# Patient Record
Sex: FEMALE | Race: OTHER | Employment: STUDENT | URBAN - NONMETROPOLITAN AREA
[De-identification: names, ages, dates, MRNs, and addresses within clinical notes are randomized per-mention and may not be internally consistent; named-entity substitution may affect disease eponyms.]

---

## 2023-09-01 ENCOUNTER — CLINICAL DOCUMENTATION (OUTPATIENT)
Dept: FAMILY MEDICINE CLINIC | Age: 23
End: 2023-09-01

## 2023-09-01 DIAGNOSIS — M25.551 RIGHT HIP PAIN: Primary | ICD-10-CM

## 2023-09-01 NOTE — PROGRESS NOTES
[facility] Evaluation Template      Name Maximino Gómez   Student ID [id number]   Date of Evaluation 8/31/2023   Sport Tennis   Year in [employer]   Date of Birth 2000   Phone [phone]   Name of Examining Practitioner Dr. Sherren Coulter    Injury R hip pain   Date of Injury         History: Ath has had R intermittent pain for since winter. Ath c/o pain with running and change of direction with tennis. No popping or clicking     Evaluation: No ttp greater troch or hip flexor, TTP adductors, pubic ramus, and pubic symphysis ; negative sit up, negative log roll, negative hip scour, approx 5 degree IR    Recommendations/Restrictions:  Activity as tolerated, formal physical therapy, and xray hip with pelvis    Diagnosis: R hip pain- tendonitis vs MARIXA vs sports hernia    Cleared/Not Cleared: Cleared as tolerated     Other Testing/Procedure:  [x] X-Ray   [] MRI   [] CT Scan   [x] PT/OT   [] Other    Follow-Up: PRN    AT Signature: Narayan Pederson    Physician Signature:

## 2023-09-14 ENCOUNTER — HOSPITAL ENCOUNTER (OUTPATIENT)
Dept: GENERAL RADIOLOGY | Age: 23
Discharge: HOME | End: 2023-09-14
Payer: COMMERCIAL

## 2023-09-14 ENCOUNTER — HOSPITAL ENCOUNTER (OUTPATIENT)
Age: 23
Discharge: HOME | End: 2023-09-14
Payer: COMMERCIAL

## 2023-09-14 ENCOUNTER — HOSPITAL ENCOUNTER (OUTPATIENT)
Dept: PHYSICAL THERAPY | Age: 23
Setting detail: THERAPIES SERIES
Discharge: HOME | End: 2023-09-14
Payer: COMMERCIAL

## 2023-09-14 DIAGNOSIS — M25.551 RIGHT HIP PAIN: ICD-10-CM

## 2023-09-14 PROCEDURE — 97163 PT EVAL HIGH COMPLEX 45 MIN: CPT

## 2023-09-14 PROCEDURE — 97140 MANUAL THERAPY 1/> REGIONS: CPT

## 2023-09-14 PROCEDURE — 73502 X-RAY EXAM HIP UNI 2-3 VIEWS: CPT

## 2023-09-14 PROCEDURE — 97110 THERAPEUTIC EXERCISES: CPT

## 2023-09-14 NOTE — PROGRESS NOTES
** PLEASE SIGN, DATE AND TIME CERTIFICATION BELOW AND RETURN TO [facility] OUTPATIENT REHABILITATION (FAX #: [phone]). ATTEST/CO-SIGN IF ACCESSING VIA [employer] THANK YOU.**    I certify that I have examined the patient below and determined that Physical Medicine and Rehabilitation service is necessary and that I approve the established plan of care for up to 90 days or as specifically noted. Attestation, signature or co-signature of physician indicates approval of certification requirements.    ________________________ ____________ __________  Physician Signature   Date   Time  [address] [facility]  PHYSICAL THERAPY  [x] EVALUATION  [] DAILY NOTE (LAND) [] DAILY NOTE (AQUATIC ) [] PROGRESS NOTE [] DISCHARGE NOTE    [x] [address] [facility] [address] [facility] [place]   [] [address] [place] [address]    [] [address] [place] [address]   [] [facility]    Date: 2023  Patient Name:  Martir Hughes  : 2000  MRN: [id number]  CSN: [id number]    Referring Practitioner Ceasar Haile MD   Diagnosis Right hip pain [M25.551]    Treatment Diagnosis Right hip pain    Date of Evaluation 23    Additional Pertinent History Cyst removal off lumbar spine 3 years ago . R radial fracture      Functional Outcome Measure Used LEFS    Functional Outcome Score 59 (23)       Insurance: Primary: Payor: Kelly Mallory /  /  / ,   Secondary: NOEMI LYNN   Authorization Information: PRE CERTIFICATION REQUIRED: NO  INSURANCE THERAPY BENEFIT:  21 VISITS -HARDMAX -NONE USED  AQUATIC THERAPY COVERED: YES  MODALITIES COVERED:  YES  TELEHEALTH COVERED:   REFERENCE NUMBER: HANK 2023      Visit # 1, 1/10 for progress note   Visits Allowed: 20 hard max   Recertification Date:    Physician Follow-Up: No current follow up scheduled   Physician Orders: Evaluate and Treat   History of Present Illness:  Patient  presents with right anterior hip pain present since 3/23.   Patient reports that

## 2023-09-19 ENCOUNTER — HOSPITAL ENCOUNTER (OUTPATIENT)
Dept: PHYSICAL THERAPY | Age: 23
Setting detail: THERAPIES SERIES
Discharge: HOME | End: 2023-09-19
Payer: COMMERCIAL

## 2023-09-19 PROCEDURE — 97110 THERAPEUTIC EXERCISES: CPT

## 2023-09-19 PROCEDURE — 97140 MANUAL THERAPY 1/> REGIONS: CPT

## 2023-09-19 NOTE — PROGRESS NOTES
[facility]  Therapy Contact Note      Date: 2023  Patient Name: Yahaira Newberry        MRN: [id number]    Account Number: [de-identified]  : 2000  (26 y.o.)  Gender: female       Set up [facility] transportation for the following dates:    23 1315  23 1015  10/3/23 845  10/5/23 930  10/10/23 1145  10/12/23 1145  10/17/23 1145  10/19/23 1145  10/24/23 1145  10/26/23 1145  10/31/23 1145  23 224 Darci Tom has in their notes that the patient address is [employer].  time 30 minutes before listed appointment time.     Karina Ibarra, Rehab Tech
treatment well  []  Patient limited by fatigue  [x]  Patient limited by pain   []  Patient limited by medical complications  []  Other:     Assessment: Initiated manual therapy intervention with use of Hawk  this date with increased erythema noted throughout quad, hip adductors and sartorius mm groups. Initiated additional flexibility activities with maintaining pain free ROM. Initiated additional strengthening activities without reported increase in pain, if staying in limited ROM. Body Structures/Functions/Activity Limitations: impaired activity tolerance, impaired balance, impaired endurance, and impaired motor control, strength, gait, activity tolerance, core stability  Prognosis: good    GOALS:  Patient Goal: Play tennis without pain    Short Term Goals:  5 weeks  Patient will demonstrate good core engagement and postural awareness during therapy session with out cues  to carry over to sport related activities   Patient will improve  lumbar and hip AROM  extension to 10 degrees  bilaterally to decrease strain and return to sport  Patient will improve r strength to 4/5  in all planes for stabilization when  running   Patient will tolerate running for 20 minutes on even surfaces with with < 3/10 pain. Long Term Goals: 10 weeks  Patient will improve AROM of R hip  flexion from 0 degrees to 110 degrees to normalize gait pattern. Patient will improve R hip and knee strength to 5/5 to perform single leg squat with level pelvis to assist with return sport without restriction. Patient will perform R single limb stance x 30 seconds while maintaining level pelvis to tolerate gait and running  on uneven surfaces. Patient to demonstrate knowledge and compliance of HEP  Patient to report decreased anterior hip pain to 1/10 following sport  Patient Education:   [x]  HEP/Education Completed: Plan of Care, Goals, POC, Role of PT, initiation of HEP with handouts provided.   [facility]

## 2023-09-21 ENCOUNTER — HOSPITAL ENCOUNTER (OUTPATIENT)
Dept: PHYSICAL THERAPY | Age: 23
Setting detail: THERAPIES SERIES
Discharge: HOME | End: 2023-09-21
Payer: COMMERCIAL

## 2023-09-21 PROCEDURE — 97110 THERAPEUTIC EXERCISES: CPT

## 2023-09-21 NOTE — PROGRESS NOTES
[address] [facility]  PHYSICAL THERAPY  [] EVALUATION  [x] DAILY NOTE (LAND) [] DAILY NOTE (AQUATIC ) [] PROGRESS NOTE [] DISCHARGE NOTE    [x] [facility] [place]   [] [address] [place] [address]    [] [facility]   [] [facility]    Date: 2023  Patient Name:  Maximino Gómez  : 2000  MRN: [id number]  CSN: [id number]    Referring Practitioner Claudia Dorantes MD   Diagnosis Right hip pain [M25.551]    Treatment Diagnosis Right hip pain    Date of Evaluation 23    Additional Pertinent History Cyst removal off lumbar spine 3 years ago . R radial fracture      Functional Outcome Measure Used LEFS    Functional Outcome Score 59 (23)       Insurance: Primary: Payor: Gustavo Gant /  /  / ,   Secondary: NOEMI LYNN   Authorization Information: PRE CERTIFICATION REQUIRED: NO  INSURANCE THERAPY BENEFIT:  21 VISITS -HARDMAX -NONE USED  AQUATIC THERAPY COVERED: YES  MODALITIES COVERED:  YES  TELEHEALTH COVERED:   REFERENCE NUMBER: HANK 2023      Visit # 3, 3/10 for progress note   Visits Allowed: 20 hard max   Recertification Date: 38   Physician Follow-Up: No current follow up scheduled   Physician Orders: Evaluate and Treat   History of Present Illness:  Patient  presents with right anterior hip pain present since 3/23. Patient reports that her pain began idiopathic initially with R point specific anterior groin pain. Patient reports anterior sharp hip pain progressively has gotten worse throughout the entire anterior thigh, lateral thigh and now stiffness throughout lumbar spine. 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 Patient reports difficulty with serving, running, twisting, cutting, jumping and hopping. Patient reports difficulty with hip flexion following serving or running activities. Patient has been actively participating with ATC services, and is scheduled for an xray.

## 2023-09-26 ENCOUNTER — HOSPITAL ENCOUNTER (OUTPATIENT)
Dept: PHYSICAL THERAPY | Age: 23
Setting detail: THERAPIES SERIES
Discharge: HOME | End: 2023-09-26
Payer: COMMERCIAL

## 2023-09-26 PROCEDURE — 97110 THERAPEUTIC EXERCISES: CPT

## 2023-09-26 NOTE — PROGRESS NOTES
720 [facility]  PHYSICAL THERAPY  [] EVALUATION  [x] DAILY NOTE (LAND) [] DAILY NOTE (AQUATIC ) [] PROGRESS NOTE [] DISCHARGE NOTE    [x] OUTPATIENT REHABILITATION [facility]ENTER [place]   [] [address] [place] [address]    [] [facility]   [] Everett Hartmann    Date: 2023  Patient Name:  Kevin Chambers  : 2000  MRN: [id number]  CSN: [id number]    Referring Practitioner El Gillespie MD   Diagnosis Right hip pain [M25.551]    Treatment Diagnosis Right hip pain    Date of Evaluation 23    Additional Pertinent History Cyst removal off lumbar spine 3 years ago . R radial fracture      Functional Outcome Measure Used LEFS    Functional Outcome Score 59 (23)       Insurance: Primary: Payor: Ronald Ramon /  /  / ,   Secondary: NOEMI LYNN   Authorization Information: PRE CERTIFICATION REQUIRED: NO  INSURANCE THERAPY BENEFIT:  21 VISITS -HARDMAX -NONE USED  AQUATIC THERAPY COVERED: YES  MODALITIES COVERED:  YES  TELEHEALTH COVERED:   REFERENCE NUMBER: HANK 2023      Visit # 4, 4/10 for progress note   Visits Allowed: 20 hard max   Recertification Date:    Physician Follow-Up: No current follow up scheduled   Physician Orders: Evaluate and Treat   History of Present Illness:  Patient  presents with right anterior hip pain present since 3/23. Patient reports that her pain began idiopathic initially with R point specific anterior groin pain. Patient reports anterior sharp hip pain progressively has gotten worse throughout the entire anterior thigh, lateral thigh and now stiffness throughout lumbar spine. 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 Patient reports difficulty with hip flexion following serving or running activities. Patient has been actively participating with ATC services, and is scheduled for an xray.

## 2023-09-28 ENCOUNTER — APPOINTMENT (OUTPATIENT)
Dept: PHYSICAL THERAPY | Age: 23
End: 2023-09-28
Payer: COMMERCIAL

## 2023-10-03 ENCOUNTER — HOSPITAL ENCOUNTER (OUTPATIENT)
Dept: PHYSICAL THERAPY | Age: 23
Setting detail: THERAPIES SERIES
Discharge: HOME | End: 2023-10-03
Payer: COMMERCIAL

## 2023-10-03 PROCEDURE — 97110 THERAPEUTIC EXERCISES: CPT

## 2023-10-03 NOTE — PROGRESS NOTES
while maintaining level pelvis to tolerate gait and running  on uneven surfaces. Patient to demonstrate knowledge and compliance of HEP  Patient to report decreased anterior hip pain to 1/10 following sport  Patient Education:   [x]  HEP/Education Completed: Educated patient on need for continued hip flexor stretching between matches and improving core activation in serving to decrease lumbar spine strain during athletic competition  [address] [place] Access Code:  []  No new Education completed  []  Reviewed Prior HEP      [x]  Patient verbalized and/or demonstrated understanding of education provided. []  Patient unable to verbalize and/or demonstrate understanding of education provided. Will continue education. []  Barriers to learning:     PLAN:  Treatment Recommendations: Strengthening, Range of Motion, Balance Training, Functional Mobility Training, Transfer Training, Endurance Training, Gait Training, Stair Training, Neuromuscular Re-education, Manual Therapy - Soft Tissue Mobilization, Manual Therapy - Joint Manipulation, Pain Management, Home Exercise Program, Patient Education, Self-Care Education and Training, Positioning, Integrative Dry Needling, and Modalities    [x]  Plan of care initiated. Plan to see patient 2 times per week for 10 weeks to address the treatment planned outlined above.   []  Continue with current plan of care  []  Modify plan of care as follows:    []  Hold pending physician visit  []  Discharge    Time In 0830   Time Out 0915   Timed Code Minutes: 38 min   Total Treatment Time: 45 min     Electronically Signed by: Donita Harrison PT

## 2023-10-05 ENCOUNTER — HOSPITAL ENCOUNTER (OUTPATIENT)
Dept: PHYSICAL THERAPY | Age: 23
Setting detail: THERAPIES SERIES
Discharge: HOME | End: 2023-10-05
Payer: COMMERCIAL

## 2023-10-05 PROCEDURE — 97110 THERAPEUTIC EXERCISES: CPT

## 2023-10-05 NOTE — PROGRESS NOTES
720 [facility]  PHYSICAL THERAPY  [] EVALUATION  [x] DAILY NOTE (LAND) [] DAILY NOTE (AQUATIC ) [] PROGRESS NOTE [] DISCHARGE NOTE    [x] OUTPATIENT REHABILITATION [facility]ENTER [place]   [] [address] [place] [address]    [] [facility]   [] Mary Sizer    Date: 10/5/2023  Patient Name:  Manuel Cobb  : 2000  MRN: [id number]  CSN: [id number]    Referring Practitioner Adelia Campbell MD   Diagnosis Right hip pain [M25.551]    Treatment Diagnosis Right hip pain    Date of Evaluation 23    Additional Pertinent History Cyst removal off lumbar spine 3 years ago . R radial fracture      Functional Outcome Measure Used LEFS    Functional Outcome Score 59 (23)       Insurance: Primary: Payor: Tina Mckee /  /  / ,   Secondary: NOEMI LYNN   Authorization Information: PRE CERTIFICATION REQUIRED: NO  INSURANCE THERAPY BENEFIT:  21 VISITS -HARDMAX -NONE USED  AQUATIC THERAPY COVERED: YES  MODALITIES COVERED:  YES  TELEHEALTH COVERED:   REFERENCE NUMBER: HANK 2023      Visit # 6, 6/10 for progress note   Visits Allowed: 20 hard max   Recertification Date:    Physician Follow-Up: No current follow up scheduled   Physician Orders: Evaluate and Treat   History of Present Illness:  Patient  presents with right anterior hip pain present since 3/23. Patient reports that her pain began idiopathic initially with R point specific anterior groin pain. Patient reports anterior sharp hip pain progressively has gotten worse throughout the entire anterior thigh, lateral thigh and now stiffness throughout lumbar spine. 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 Patient reports difficulty with hip flexion following serving or running activities. Patient has been actively participating with ATC services, and is scheduled for an xray.

## 2023-10-10 ENCOUNTER — APPOINTMENT (OUTPATIENT)
Dept: PHYSICAL THERAPY | Age: 23
End: 2023-10-10
Payer: COMMERCIAL

## 2023-10-12 ENCOUNTER — APPOINTMENT (OUTPATIENT)
Dept: PHYSICAL THERAPY | Age: 23
End: 2023-10-12
Payer: COMMERCIAL

## 2023-10-17 ENCOUNTER — HOSPITAL ENCOUNTER (OUTPATIENT)
Dept: PHYSICAL THERAPY | Age: 23
Setting detail: THERAPIES SERIES
Discharge: HOME | End: 2023-10-17
Payer: COMMERCIAL

## 2023-10-17 PROCEDURE — 97110 THERAPEUTIC EXERCISES: CPT

## 2023-10-17 NOTE — PROGRESS NOTES
[address] [facility]  PHYSICAL THERAPY  [] EVALUATION  [x] DAILY NOTE (LAND) [] DAILY NOTE (AQUATIC ) [] PROGRESS NOTE [] DISCHARGE NOTE    [x] OUTPATIENT REHABILITATION [facility] [place]   [] [address] [place] [address]    [] [facility]   [] [facility]    Date: 10/17/2023  Patient Name:  Shayna Baez  : 2000  MRN: [id number]  CSN: [id number]    Referring Practitioner John Bhagat MD   Diagnosis Right hip pain [M25.551]    Treatment Diagnosis Right hip pain    Date of Evaluation 23    Additional Pertinent History Cyst removal off lumbar spine 3 years ago . R radial fracture      Functional Outcome Measure Used LEFS    Functional Outcome Score 59 (23)       Insurance: Primary: Payor: Olivia Hernandez /  /  / ,   Secondary: NOEMI LYNN   Authorization Information: PRE CERTIFICATION REQUIRED: NO  INSURANCE THERAPY BENEFIT:  21 VISITS -HARDMAX -NONE USED  AQUATIC THERAPY COVERED: YES  MODALITIES COVERED:  YES  TELEHEALTH COVERED:   REFERENCE NUMBER: HANK 2023      Visit # 7, 10 for progress note   Visits Allowed: 20 hard max   Recertification Date: 29   Physician Follow-Up: No current follow up scheduled   Physician Orders: Evaluate and Treat   History of Present Illness:  Patient  presents with right anterior hip pain present since 3/23. Patient reports that her pain began idiopathic initially with R point specific anterior groin pain. Patient reports anterior sharp hip pain progressively has gotten worse throughout the entire anterior thigh, lateral thigh and now stiffness throughout lumbar spine. 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 Patient reports difficulty with hip flexion following serving or running activities. Patient has been actively participating with ATC services, and is scheduled for an xray.

## 2023-10-19 ENCOUNTER — HOSPITAL ENCOUNTER (OUTPATIENT)
Dept: PHYSICAL THERAPY | Age: 23
Setting detail: THERAPIES SERIES
Discharge: HOME | End: 2023-10-19
Payer: COMMERCIAL

## 2023-10-19 PROCEDURE — 97110 THERAPEUTIC EXERCISES: CPT

## 2023-10-19 NOTE — PROGRESS NOTES
[address] [facility]  PHYSICAL THERAPY  [] EVALUATION  [x] DAILY NOTE (LAND) [] DAILY NOTE (AQUATIC ) [] PROGRESS NOTE [] DISCHARGE NOTE    [x] OUT[facility]AB[facility] [place]   [] [address] [place] [address]    [] [facility]   [] Marleni Chaney    Date: 10/19/2023  Patient Name:  Jose Francisco Duenas  : 2000  MRN: [id number]  CSN: [id number]    Referring Practitioner Mirta Maciel MD   Diagnosis Right hip pain [M25.551]    Treatment Diagnosis Right hip pain    Date of Evaluation 23    Additional Pertinent History Cyst removal off lumbar spine 3 years ago . R radial fracture      Functional Outcome Measure Used LEFS    Functional Outcome Score 59 (23)       Insurance: Primary: Payor: Paxton Morgan /  /  / ,   Secondary: NOEMI LYNN   Authorization Information: PRE CERTIFICATION REQUIRED: NO  INSURANCE THERAPY BENEFIT:  21 VISITS -HARDMAX -NONE USED  AQUATIC THERAPY COVERED: YES  MODALITIES COVERED:  YES  TELEHEALTH COVERED:   REFERENCE NUMBER: HANK 2023      Visit # 9, 10 for progress note   Visits Allowed: 20 hard max   Recertification Date: 80/3/46   Physician Follow-Up: No current follow up scheduled   Physician Orders: Evaluate and Treat   History of Present Illness:  Patient  presents with right anterior hip pain present since 3/23. Patient reports that her pain began idiopathic initially with R point specific anterior groin pain. Patient reports anterior sharp hip pain progressively has gotten worse throughout the entire anterior thigh, lateral thigh and now stiffness throughout lumbar spine. 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 Patient reports difficulty with hip flexion following serving or running activities. Patient has been actively participating with ATC services, and is scheduled for an xray.

## 2023-10-24 ENCOUNTER — APPOINTMENT (OUTPATIENT)
Dept: PHYSICAL THERAPY | Age: 23
End: 2023-10-24
Payer: COMMERCIAL

## 2023-10-26 ENCOUNTER — HOSPITAL ENCOUNTER (OUTPATIENT)
Dept: PHYSICAL THERAPY | Age: 23
Setting detail: THERAPIES SERIES
Discharge: HOME | End: 2023-10-26
Payer: COMMERCIAL

## 2023-10-26 PROCEDURE — 97110 THERAPEUTIC EXERCISES: CPT

## 2023-10-26 NOTE — PROGRESS NOTES
Patient tolerated treatment well  []  Patient limited by fatigue  [x]  Patient limited by pain   []  Patient limited by medical complications  []  Other:     Assessment:   Continued progressive strengthening this date with additional B LE strengthening,   Patient reporting increased fatigue in involved LE. Denied increase in pain. Patient able to progress to standing stretches this date with relief reported following. Body Structures/Functions/Activity Limitations: impaired activity tolerance, impaired balance, impaired endurance, and impaired motor control, strength, gait, activity tolerance, core stability  Prognosis: good    GOALS:  Patient Goal: Play tennis without pain    Short Term Goals:  5 weeks  Patient will demonstrate good core engagement and postural awareness during therapy session with out cues  to carry over to sport related activities   Patient will improve  lumbar and hip AROM  extension to 10 degrees  bilaterally to decrease strain and return to sport  Patient will improve r strength to 4/5  in all planes for stabilization when  running   Patient will tolerate running for 20 minutes on even surfaces with with < 3/10 pain. Long Term Goals: 10 weeks  Patient will improve AROM of R hip  flexion from 0 degrees to 110 degrees to normalize gait pattern. Patient will improve R hip and knee strength to 5/5 to perform single leg squat with level pelvis to assist with return sport without restriction. Patient will perform R single limb stance x 30 seconds while maintaining level pelvis to tolerate gait and running  on uneven surfaces.     Patient to demonstrate knowledge and compliance of HEP  Patient to report decreased anterior hip pain to 1/10 following sport  Patient Education:   [x]  HEP/Education Completed: Educated patient on need for continued hip flexor stretching between matches and improving core activation in serving to decrease lumbar spine strain during athletic

## 2023-10-31 ENCOUNTER — HOSPITAL ENCOUNTER (OUTPATIENT)
Dept: PHYSICAL THERAPY | Age: 23
Setting detail: THERAPIES SERIES
Discharge: HOME | End: 2023-10-31
Payer: COMMERCIAL

## 2023-10-31 PROCEDURE — 97110 THERAPEUTIC EXERCISES: CPT

## 2023-10-31 NOTE — PROGRESS NOTES
720 [facility]  PHYSICAL THERAPY  [] EVALUATION  [x] DAILY NOTE (LAND) [] DAILY NOTE (AQUATIC ) [] PROGRESS NOTE [] DISCHARGE NOTE    [x] OUT[facility]AB[facility] [place]   [] [address] [place] [address]    [] [facility]   [] Araceli Cho    Date: 10/31/2023  Patient Name:  Jenna Javier  : 2000  MRN: [id number]  CSN: [id number]    Referring Practitioner Marycruz Qureshi MD   Diagnosis Right hip pain [M25.551]    Treatment Diagnosis Right hip pain    Date of Evaluation 23    Additional Pertinent History Cyst removal off lumbar spine 3 years ago . R radial fracture      Functional Outcome Measure Used LEFS    Functional Outcome Score 59 (23)       Insurance: Primary: Payor: Landy Dixon /  /  / ,   Secondary: NOEMI LYNN   Authorization Information: PRE CERTIFICATION REQUIRED: NO  INSURANCE THERAPY BENEFIT:  21 VISITS -HARDMAX -NONE USED  AQUATIC THERAPY COVERED: YES  MODALITIES COVERED:  YES  TELEHEALTH COVERED:   REFERENCE NUMBER: HANK 2023      Visit # 10, 10 for progress note   Visits Allowed: 20 hard max   Recertification Date:    Physician Follow-Up: No current follow up scheduled   Physician Orders: Evaluate and Treat   History of Present Illness:  Patient  presents with right anterior hip pain present since 3/23. Patient reports that her pain began idiopathic initially with R point specific anterior groin pain. Patient reports anterior sharp hip pain progressively has gotten worse throughout the entire anterior thigh, lateral thigh and now stiffness throughout lumbar spine. 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 Patient reports difficulty with hip flexion following serving or running activities. Patient has been actively participating with ATC services, and is scheduled for an xray.

## 2023-11-02 ENCOUNTER — HOSPITAL ENCOUNTER (OUTPATIENT)
Dept: PHYSICAL THERAPY | Age: 23
Setting detail: THERAPIES SERIES
Discharge: HOME | End: 2023-11-02
Payer: COMMERCIAL

## 2023-11-02 PROCEDURE — 97110 THERAPEUTIC EXERCISES: CPT

## 2023-11-02 NOTE — DISCHARGE SUMMARY
720 [facility]  PHYSICAL THERAPY  [] EVALUATION  [] DAILY NOTE (LAND) [] DAILY NOTE (AQUATIC ) [] PROGRESS NOTE [x] DISCHARGE NOTE    [x] OUT[facility]AB[facility]ATION [facility] [place]   [] [address] [place] [address]    [] [facility]   [] Alexandrea Carvera    Date: 2023  Patient Name:  David Chowdary  : 2000  MRN: [id number]  CSN: [id number]    Referring Practitioner Pedro Irvin MD   Diagnosis Right hip pain [M25.551]    Treatment Diagnosis Right hip pain    Date of Evaluation 23    Additional Pertinent History Cyst removal off lumbar spine 3 years ago . R radial fracture      Functional Outcome Measure Used LEFS    Functional Outcome Score 59 (23) 76 ( 23)      Insurance: Primary: Payor: Fan Perez /  /  / ,   Secondary: NOEMI LYNN   Authorization Information: PRE CERTIFICATION REQUIRED: NO  INSURANCE THERAPY BENEFIT:  21 VISITS -HARDMAX -NONE USED  AQUATIC THERAPY COVERED: YES  MODALITIES COVERED:  YES  TELEHEALTH COVERED:   REFERENCE NUMBER: HANK 2023      Visit # 11, 0/10 for progress note   Visits Allowed: 20 hard max   Recertification Date:    Physician Follow-Up: No current follow up scheduled   Physician Orders: Evaluate and Treat   History of Present Illness:  Patient  presents with right anterior hip pain present since 3/23. Patient reports that her pain began idiopathic initially with R point specific anterior groin pain. Patient reports anterior sharp hip pain progressively has gotten worse throughout the entire anterior thigh, lateral thigh and now stiffness throughout lumbar spine. 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 Patient reports difficulty with hip flexion following serving or running activities.     Patient has been actively participating with ATC services, and is scheduled for an

## 2024-05-10 ENCOUNTER — OFFICE VISIT (OUTPATIENT)
Dept: FAMILY MEDICINE CLINIC | Age: 24
End: 2024-05-10
Payer: COMMERCIAL

## 2024-05-10 VITALS
SYSTOLIC BLOOD PRESSURE: 120 MMHG | RESPIRATION RATE: 16 BRPM | HEIGHT: 66 IN | WEIGHT: 150.8 LBS | TEMPERATURE: 98.1 F | BODY MASS INDEX: 24.23 KG/M2 | DIASTOLIC BLOOD PRESSURE: 74 MMHG | OXYGEN SATURATION: 98 % | HEART RATE: 75 BPM

## 2024-05-10 DIAGNOSIS — M54.6 ACUTE RIGHT-SIDED THORACIC BACK PAIN: Primary | ICD-10-CM

## 2024-05-10 PROCEDURE — 1036F TOBACCO NON-USER: CPT | Performed by: FAMILY MEDICINE

## 2024-05-10 PROCEDURE — 99203 OFFICE O/P NEW LOW 30 MIN: CPT | Performed by: FAMILY MEDICINE

## 2024-05-10 PROCEDURE — G8420 CALC BMI NORM PARAMETERS: HCPCS | Performed by: FAMILY MEDICINE

## 2024-05-10 PROCEDURE — G8427 DOCREV CUR MEDS BY ELIG CLIN: HCPCS | Performed by: FAMILY MEDICINE

## 2024-05-10 RX ORDER — PREDNISONE 20 MG/1
20 TABLET ORAL 2 TIMES DAILY
Qty: 14 TABLET | Refills: 0 | Status: SHIPPED | OUTPATIENT
Start: 2024-05-10 | End: 2024-05-17

## 2024-05-10 SDOH — ECONOMIC STABILITY: FOOD INSECURITY: WITHIN THE PAST 12 MONTHS, THE FOOD YOU BOUGHT JUST DIDN'T LAST AND YOU DIDN'T HAVE MONEY TO GET MORE.: NEVER TRUE

## 2024-05-10 SDOH — ECONOMIC STABILITY: FOOD INSECURITY: WITHIN THE PAST 12 MONTHS, YOU WORRIED THAT YOUR FOOD WOULD RUN OUT BEFORE YOU GOT MONEY TO BUY MORE.: NEVER TRUE

## 2024-05-10 SDOH — ECONOMIC STABILITY: HOUSING INSECURITY
IN THE LAST 12 MONTHS, WAS THERE A TIME WHEN YOU DID NOT HAVE A STEADY PLACE TO SLEEP OR SLEPT IN A SHELTER (INCLUDING NOW)?: NO

## 2024-05-10 SDOH — ECONOMIC STABILITY: INCOME INSECURITY: HOW HARD IS IT FOR YOU TO PAY FOR THE VERY BASICS LIKE FOOD, HOUSING, MEDICAL CARE, AND HEATING?: NOT HARD AT ALL

## 2024-05-10 ASSESSMENT — PATIENT HEALTH QUESTIONNAIRE - PHQ9
SUM OF ALL RESPONSES TO PHQ QUESTIONS 1-9: 0
SUM OF ALL RESPONSES TO PHQ9 QUESTIONS 1 & 2: 0
2. FEELING DOWN, DEPRESSED OR HOPELESS: NOT AT ALL
SUM OF ALL RESPONSES TO PHQ QUESTIONS 1-9: 0
1. LITTLE INTEREST OR PLEASURE IN DOING THINGS: NOT AT ALL

## 2024-05-10 NOTE — PROGRESS NOTES
[facility] PROFESSIONAL [facility] FAM[facility]ICINE  [address]  [facility] [address]  Dept: [phone]  Dept Fax: [phone]  Loc: [phone]  PROGRESS NOTE      Visit Date: 5/10/2024    Krystal Hinton is a 23 y.o. female who presents today for:  Chief Complaint   Patient presents with    Back Pain     Patient has been experiencing back pain for the past 1.5 weeks. Patient is seeing chiropractor.        Chief complaint:  thoracic back pain    Subjective:  HPI    Thoracic back pain.  Seeing chiropractor Dr. Vaughn. Had adjustment today. Follows with [facility] at [facility].   No xray.  Pain with trunk rotation. Started back to tennis yesterday after 1.5 weeks. Worse with backhand.  Right hand dominant.  Tried otc oral nsaid.     [employer] [employer].  [employer] Tournament starts Tuesday in [place]    From [facility]    Review of Systems  There is no problem list on file for this patient.    History reviewed. No pertinent past medical history.   History reviewed. No pertinent surgical history.  History reviewed. No pertinent family history.  Social History     Tobacco Use    Smoking status: Never    Smokeless tobacco: Never   Substance Use Topics    Alcohol use: Not on file      No current outpatient medications on file.     No current facility-administered medications for this visit.     No Known Allergies  Health Maintenance   Topic Date Due    Hepatitis B vaccine (1 of 3 - 3-dose series) Never done    COVID-19 Vaccine (1) Never done    Varicella vaccine (1 of 2 - 2-dose childhood series) Never done    HPV vaccine (1 - 2-dose series) Never done    Depression Screen  Never done    HIV screen  Never done    Chlamydia/GC screen  Never done    Hepatitis C screen  Never done    DTaP/Tdap/Td vaccine (1 - Tdap) Never done    Pap smear  Never done    Flu vaccine (Season Ended) 08/01/2024    Hepatitis A vaccine  Aged Out    Hib vaccine  Aged Out    Polio vaccine  Aged Out    Meningococcal (ACWY) vaccine  
Detail Level: Detailed